# Patient Record
Sex: FEMALE | Race: WHITE | NOT HISPANIC OR LATINO | Employment: UNEMPLOYED | URBAN - METROPOLITAN AREA
[De-identification: names, ages, dates, MRNs, and addresses within clinical notes are randomized per-mention and may not be internally consistent; named-entity substitution may affect disease eponyms.]

---

## 2024-01-17 ENCOUNTER — HOSPITAL ENCOUNTER (OUTPATIENT)
Dept: MAMMOGRAPHY | Facility: HOSPITAL | Age: 39
Discharge: HOME | End: 2024-01-17
Admitting: PHYSICIAN ASSISTANT
Payer: MEDICAID

## 2024-01-17 DIAGNOSIS — Z12.31 SCREENING MAMMOGRAM, ENCOUNTER FOR: ICD-10-CM

## 2024-01-17 PROCEDURE — 77063 BREAST TOMOSYNTHESIS BI: CPT

## 2024-01-17 PROCEDURE — 77067 SCR MAMMO BI INCL CAD: CPT

## 2024-10-31 ENCOUNTER — TRANSCRIBE ORDERS (OUTPATIENT)
Dept: ADMINISTRATIVE | Facility: HOSPITAL | Age: 39
End: 2024-10-31
Payer: MEDICAID

## 2024-10-31 DIAGNOSIS — Z15.01: Primary | ICD-10-CM

## 2024-10-31 DIAGNOSIS — Z15.02: Primary | ICD-10-CM

## 2024-10-31 DIAGNOSIS — C54.1: Primary | ICD-10-CM

## 2024-10-31 DIAGNOSIS — Z15.09: Primary | ICD-10-CM

## 2025-02-21 ENCOUNTER — TELEPHONE (OUTPATIENT)
Dept: GENERAL RADIOLOGY | Facility: HOSPITAL | Age: 40
End: 2025-02-21

## 2025-02-21 NOTE — TELEPHONE ENCOUNTER
Patient annual mammography reminder letter was returned for reason not deliverable as addressed. An attempt was made to leave a message but the recording stated the subscriber is not in service. The letter was mailed to [facility] [place] and also to [address] [place] [address] but not success with either address.

## 2025-04-14 ENCOUNTER — HOSPITAL ENCOUNTER (OUTPATIENT)
Dept: CT IMAGING | Facility: HOSPITAL | Age: 40
Discharge: HOME | End: 2025-04-14
Admitting: OBSTETRICS & GYNECOLOGY
Payer: COMMERCIAL

## 2025-04-14 DIAGNOSIS — Z15.01: ICD-10-CM

## 2025-04-14 DIAGNOSIS — Z15.09: ICD-10-CM

## 2025-04-14 DIAGNOSIS — Z15.02: ICD-10-CM

## 2025-04-14 DIAGNOSIS — C54.1: ICD-10-CM

## 2025-04-14 PROCEDURE — 71250 CT THORAX DX C-: CPT

## 2025-04-14 PROCEDURE — 74176 CT ABD & PELVIS W/O CONTRAST: CPT

## 2025-04-30 ENCOUNTER — TRANSCRIBE ORDERS (OUTPATIENT)
Dept: ADMINISTRATIVE | Facility: HOSPITAL | Age: 40
End: 2025-04-30
Payer: COMMERCIAL

## 2025-04-30 DIAGNOSIS — R93.41 ABNORMAL RADIOLOGIC FINDINGS ON DIAGNOSTIC IMAGING OF RENAL PELVIS, URETER, OR BLADDER: Primary | ICD-10-CM
